# Patient Record
Sex: MALE | HISPANIC OR LATINO | URBAN - METROPOLITAN AREA
[De-identification: names, ages, dates, MRNs, and addresses within clinical notes are randomized per-mention and may not be internally consistent; named-entity substitution may affect disease eponyms.]

---

## 2017-06-12 ENCOUNTER — APPOINTMENT (RX ONLY)
Dept: URBAN - METROPOLITAN AREA CLINIC 29 | Facility: CLINIC | Age: 67
Setting detail: DERMATOLOGY
End: 2017-06-12

## 2017-06-12 DIAGNOSIS — L29.8 OTHER PRURITUS: ICD-10-CM

## 2017-06-12 DIAGNOSIS — Z79.899 OTHER LONG TERM (CURRENT) DRUG THERAPY: ICD-10-CM

## 2017-06-12 DIAGNOSIS — L29.89 OTHER PRURITUS: ICD-10-CM

## 2017-06-12 DIAGNOSIS — L30.9 DERMATITIS, UNSPECIFIED: ICD-10-CM

## 2017-06-12 DIAGNOSIS — L81.0 POSTINFLAMMATORY HYPERPIGMENTATION: ICD-10-CM

## 2017-06-12 DIAGNOSIS — Z71.89 OTHER SPECIFIED COUNSELING: ICD-10-CM

## 2017-06-12 DIAGNOSIS — Z9119 PERSONAL HISTORY OF NONCOMPLIANCE WITH MEDICAL TREATMENT, PRESENTING HAZARDS TO HEALTH: ICD-10-CM

## 2017-06-12 PROBLEM — L55.1 SUNBURN OF SECOND DEGREE: Status: ACTIVE | Noted: 2017-06-12

## 2017-06-12 PROBLEM — E13.9 OTHER SPECIFIED DIABETES MELLITUS WITHOUT COMPLICATIONS: Status: ACTIVE | Noted: 2017-06-12

## 2017-06-12 PROBLEM — Z91.19 PATIENT'S NONCOMPLIANCE WITH OTHER MEDICAL TREATMENT AND REGIMEN: Status: ACTIVE | Noted: 2017-06-12

## 2017-06-12 PROBLEM — I10 ESSENTIAL (PRIMARY) HYPERTENSION: Status: ACTIVE | Noted: 2017-06-12

## 2017-06-12 PROCEDURE — ? HIGH RISK MEDICATION MONITORING

## 2017-06-12 PROCEDURE — 99203 OFFICE O/P NEW LOW 30 MIN: CPT | Mod: 25

## 2017-06-12 PROCEDURE — ? COUNSELING

## 2017-06-12 PROCEDURE — 11101: CPT

## 2017-06-12 PROCEDURE — ? BIOPSY BY PUNCH METHOD

## 2017-06-12 PROCEDURE — ? PRESCRIPTION

## 2017-06-12 PROCEDURE — 11100: CPT

## 2017-06-12 RX ORDER — DOXYCYCLINE 100 MG/1
1 TABLET, FILM COATED ORAL BID
Qty: 60 | Refills: 0 | Status: ERX | COMMUNITY
Start: 2017-06-12

## 2017-06-12 RX ORDER — PREDNISONE 10 MG/1
1 TABLET ORAL AS DIRECTED
Qty: 84 | Refills: 0 | Status: ERX | COMMUNITY
Start: 2017-06-12

## 2017-06-12 RX ADMIN — DOXYCYCLINE 1: 100 TABLET, FILM COATED ORAL at 00:00

## 2017-06-12 RX ADMIN — PREDNISONE 1: 10 TABLET ORAL at 00:00

## 2017-06-12 ASSESSMENT — LOCATION ZONE DERM
LOCATION ZONE: TRUNK
LOCATION ZONE: HAND
LOCATION ZONE: ARM
LOCATION ZONE: LEG

## 2017-06-12 ASSESSMENT — LOCATION DETAILED DESCRIPTION DERM
LOCATION DETAILED: LEFT MEDIAL UPPER BACK
LOCATION DETAILED: EPIGASTRIC SKIN
LOCATION DETAILED: PERIUMBILICAL SKIN
LOCATION DETAILED: LEFT PROXIMAL DORSAL FOREARM
LOCATION DETAILED: LEFT DISTAL DORSAL FOREARM
LOCATION DETAILED: LEFT SUPERIOR UPPER BACK
LOCATION DETAILED: RIGHT DISTAL DORSAL FOREARM
LOCATION DETAILED: LEFT ULNAR DORSAL HAND
LOCATION DETAILED: RIGHT ANTERIOR PROXIMAL THIGH
LOCATION DETAILED: RIGHT PROXIMAL DORSAL FOREARM
LOCATION DETAILED: LEFT MID-UPPER BACK
LOCATION DETAILED: RIGHT ULNAR DORSAL HAND
LOCATION DETAILED: LEFT ANTERIOR DISTAL THIGH
LOCATION DETAILED: LEFT VENTRAL PROXIMAL FOREARM
LOCATION DETAILED: LEFT RADIAL DORSAL HAND
LOCATION DETAILED: RIGHT SUPERIOR LATERAL UPPER BACK
LOCATION DETAILED: RIGHT RADIAL DORSAL HAND

## 2017-06-12 ASSESSMENT — LOCATION SIMPLE DESCRIPTION DERM
LOCATION SIMPLE: RIGHT THIGH
LOCATION SIMPLE: RIGHT FOREARM
LOCATION SIMPLE: ABDOMEN
LOCATION SIMPLE: LEFT THIGH
LOCATION SIMPLE: LEFT FOREARM
LOCATION SIMPLE: LEFT HAND
LOCATION SIMPLE: RIGHT HAND
LOCATION SIMPLE: RIGHT UPPER BACK
LOCATION SIMPLE: LEFT UPPER BACK

## 2017-06-12 NOTE — PROCEDURE: BIOPSY BY PUNCH METHOD
Anesthesia Volume In Cc: 0.5
Lab Facility: [id number]
Billing Type: Third-Party Bill
Notification Instructions: Patient will be notified of biopsy results via patient portal or phone call. However, patient instructed to call the office if results are not available within 2 weeks. Results will be faxed to PCP once they are available.
Detail Level: Detailed
Dressing: pressure dressing with telfa
Home Suture Removal Text: Patient was provided a home suture removal kit and will remove their sutures at home.  If they have any questions or difficulties they will call the office.
Wound Care: Vaseline
Biopsy Type: H and E
Consent: Written consent was obtained and risks were reviewed including but not limited to scarring, infection, bleeding, scabbing, incomplete removal, nerve damage and allergy to anesthesia.
X Size Of Lesion In Cm (Optional): 0
Anesthesia Type: 1% lidocaine with epinephrine
Epidermal Sutures: none, closed by secondary intention
Bill For Surgical Tray: no
Body Location Override (Optional - Billing Will Still Be Based On Selected Body Map Location If Applicable): Left ventral proximal forearm superior
Post-Care Instructions: I reviewed with the patient in detail post-care instructions. Patient is to keep the biopsy site dry overnight, and then apply Vaseline twice daily until healed. Patient may apply hydrogen peroxide soaks to remove any crusting.
Hemostasis: None
Render Post-Care Instructions In Note?: yes
Punch Size In Mm: 3
Lab: [id number]
Body Location Override (Optional - Billing Will Still Be Based On Selected Body Map Location If Applicable): Left ventral proximal forearm inferior
Dressing: bandage
Epidermal Sutures: 4-0 Ethilon
Wound Care: Bacitracin
Suture Removal: 14 days
Biopsy Type: DIF
Hemostasis: Gelfoam
Body Location Override (Optional - Billing Will Still Be Based On Selected Body Map Location If Applicable): Abdomen
Epidermal Sutures: gel foam
Body Location Override (Optional - Billing Will Still Be Based On Selected Body Map Location If Applicable): Right upper back